# Patient Record
Sex: FEMALE | Race: WHITE | NOT HISPANIC OR LATINO | Employment: FULL TIME | URBAN - METROPOLITAN AREA
[De-identification: names, ages, dates, MRNs, and addresses within clinical notes are randomized per-mention and may not be internally consistent; named-entity substitution may affect disease eponyms.]

---

## 2019-10-08 PROCEDURE — 87070 CULTURE OTHR SPECIMN AEROBIC: CPT | Performed by: OTOLARYNGOLOGY

## 2019-10-08 PROCEDURE — 87205 SMEAR GRAM STAIN: CPT | Performed by: OTOLARYNGOLOGY

## 2022-02-17 ENCOUNTER — OFFICE VISIT (OUTPATIENT)
Dept: DERMATOLOGY | Facility: CLINIC | Age: 52
End: 2022-02-17
Payer: COMMERCIAL

## 2022-02-17 VITALS — TEMPERATURE: 99 F | HEIGHT: 61 IN | WEIGHT: 196 LBS | BODY MASS INDEX: 37 KG/M2

## 2022-02-17 DIAGNOSIS — H60.61 CHRONIC NON-INFECTIVE OTITIS EXTERNA OF RIGHT EAR, UNSPECIFIED TYPE: Primary | ICD-10-CM

## 2022-02-17 PROCEDURE — 99203 OFFICE O/P NEW LOW 30 MIN: CPT | Performed by: DERMATOLOGY

## 2022-02-17 RX ORDER — HYDROCORTISONE AND ACETIC ACID 20.75; 10.375 MG/ML; MG/ML
3 SOLUTION AURICULAR (OTIC) EVERY 6 HOURS SCHEDULED
Qty: 10 ML | Refills: 2 | Status: SHIPPED | OUTPATIENT
Start: 2022-02-17

## 2022-02-17 RX ORDER — MONTELUKAST SODIUM 10 MG/1
10 TABLET ORAL DAILY
COMMUNITY
Start: 2021-09-07

## 2022-02-17 NOTE — PROGRESS NOTES
[address] [facility], [place] DERMATOLOGY  [address] [place] [id number]  [phone]  [phone]     MRN: [id number] : 1970  Encounter: [id number]  Patient Information: Joanna Keith  Chief complaint:  Concerns regarding chronic otitis externa    History of present illness:  51-year-old female presents for concerns regarding chronic year issues irritations that has been followed by ENT and seems to have intermittent low-grade fevers only seems to be controlled with the use of the the chlorine from a pool will use of betamethasone valerate cream   Patient notes that she has been given antibiotics and other things without any improvement in the past no other concerns noted previous history of eczema noted  Past Medical History:   Diagnosis Date    Ear problems     Fatigue      Past Surgical History:   Procedure Laterality Date     SECTION      x 2     Social History   Social History     Substance and Sexual Activity   Alcohol Use Yes     Social History     Substance and Sexual Activity   Drug Use Not on file     Social History     Tobacco Use   Smoking Status Current Every Day Smoker    Types: Cigarettes   Smokeless Tobacco Never Used     Family History   Problem Relation Age of Onset    Diabetes Mother     No Known Problems Father      Meds/Allergies   No Known Allergies    Meds:  Prior to Admission medications    Medication Sig Start Date End Date Taking?  Authorizing Provider   betamethasone valerate (VALISONE) 0 1 % cream Apply topically 2 (two) times a day 20  Yes Zeynep Call MD   fluticasone [facility]) 50 mcg/act nasal spray 1 spray into each nostril daily     Yes Historical Provider, MD   montelukast (SINGULAIR) 10 mg tablet Take 10 mg by mouth daily 21  Yes Historical Provider, MD   clotrimazole 1 % external solution Apply topically 2 (two) times a day  Patient not taking: Reported on 2022     Historical Provider, MD neomycin-polymyxin-hydrocortisone (CORTISPORIN) 0 35%-10,000 units/mL-1% ophthalmic suspension 1 drop 3 (three) times a day  Patient not taking: Reported on 2/17/2022     Historical Provider, MD       Subjective:     Review of Systems:    General: negative for - chills, fatigue, fever,  weight gain or weight loss  Psychological: negative for - anxiety, behavioral disorder, concentration difficulties, decreased libido, depression, irritability, memory difficulties, mood swings, sleep disturbances or suicidal ideation  ENT: negative for - hearing difficulties , nasal congestion, nasal discharge, oral lesions, sinus pain, sneezing, sore throat  Allergy and Immunology: negative for - hives, insect bite sensitivity,  Hematological and Lymphatic: negative for - bleeding problems, blood clots,bruising, swollen lymph nodes  Endocrine: negative for - hair pattern changes, hot flashes, malaise/lethargy, mood swings, palpitations, polydipsia/polyuria, skin changes, temperature intolerance or unexpected weight change  Respiratory: negative for - cough, hemoptysis, orthopnea, shortness of breath, or wheezing  Cardiovascular: negative for - chest pain, dyspnea on exertion, edema,  Gastrointestinal: negative for - abdominal pain, nausea/vomiting  Genito-Urinary: negative for - dysuria, incontinence, irregular/heavy menses or urinary frequency/urgency  Musculoskeletal: negative for - gait disturbance, joint pain, joint stiffness, joint swelling, muscle pain, muscular weakness  Dermatological:  As in HPI  Neurological: negative for confusion, dizziness, headaches, impaired coordination/balance, memory loss, numbness/tingling, seizures, speech problems, tremors or weakness       Objective:   Temp 99 °F (37 2 °C) (Temporal)   Ht 5' 1" (1 549 m)   Wt 88 9 kg (196 lb)   BMI 37 03 kg/m²     Physical Exam:    General Appearance:    Alert, cooperative, no distress   Head:    Normocephalic, without obvious abnormality, atraumatic Skin:   A full skin exam was performed including scalp, head scalp, eyes, ears, nose, lips, neck, chest, axilla, abdomen, back, buttocks, bilateral upper extremities, bilateral lower extremities, hands, feet, fingers, toes, fingernails, and toenails ear canals with  wax noted     Assessment:     1  Chronic non-infective otitis externa of right ear, unspecified type           Plan:   Present since the amount of wax is noted I difficult for me to assess suggested patient go back to the ENT to get the ear cleared also she could consider use of Debrox to see if this will help to clear the wax    Magdaleno Belle MD  7/99/5510,0:33 PM    Portions of the record may have been created with voice recognition software   Occasional wrong word or "sound a like" substitutions may have occurred due to the inherent limitations of voice recognition software   Read the chart carefully and recognize, using context, where substitutions have occurred

## 2022-02-17 NOTE — PATIENT INSTRUCTIONS
Present since the amount of wax is noted I difficult for me to assess suggested patient go back to the ENT to get the ear cleared also she could consider use of Debrox to see if this will help to clear the wax

## 2025-01-31 ENCOUNTER — HOSPITAL ENCOUNTER (EMERGENCY)
Facility: HOSPITAL | Age: 55
Discharge: HOME | End: 2025-01-31
Attending: FAMILY MEDICINE
Payer: COMMERCIAL

## 2025-01-31 ENCOUNTER — APPOINTMENT (OUTPATIENT)
Dept: CT IMAGING | Facility: HOSPITAL | Age: 55
End: 2025-01-31
Payer: COMMERCIAL

## 2025-01-31 VITALS
BODY MASS INDEX: 37.76 KG/M2 | TEMPERATURE: 99.2 F | HEART RATE: 98 BPM | HEIGHT: 61 IN | OXYGEN SATURATION: 99 % | WEIGHT: 200 LBS | SYSTOLIC BLOOD PRESSURE: 172 MMHG | RESPIRATION RATE: 18 BRPM | DIASTOLIC BLOOD PRESSURE: 80 MMHG

## 2025-01-31 DIAGNOSIS — S09.90XA INJURY OF HEAD, INITIAL ENCOUNTER: ICD-10-CM
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
## 2025-01-31 DIAGNOSIS — M54.9 BACK PAIN: ICD-10-CM

## 2025-01-31 PROCEDURE — 99284 EMERGENCY DEPT VISIT MOD MDM: CPT | Performed by: FAMILY MEDICINE

## 2025-01-31 PROCEDURE — 99284 EMERGENCY DEPT VISIT MOD MDM: CPT

## 2025-01-31 PROCEDURE — 70450 CT HEAD/BRAIN W/O DYE: CPT

## 2025-01-31 RX ORDER — METHYLPREDNISOLONE 4 MG/1
TABLET ORAL
Qty: 21 TABLET | Refills: 0 | Status: SHIPPED | OUTPATIENT
Start: 2025-01-31

## 2025-01-31 RX ORDER — LISINOPRIL 5 MG/1
5 TABLET ORAL DAILY
COMMUNITY

## 2025-01-31 NOTE — ED PROVIDER NOTES
Time reflects when diagnosis was documented in both MDM as applicable and the Disposition within this note       Time User Action Codes Description Comment    1/31/2025  1:47 PM Danny Eduardo Add [W19.XXXA] Fall, initial encounter     1/31/2025  1:47 PM HattiemontseJoseDanny Add [S09.90XA] Injury of head, initial encounter     1/31/2025  2:05 PM Danny Eduardo Add [M54.9] Back pain           ED Disposition       ED Disposition   Discharge    Condition   Stable    Date/Time   Fri Jan 31, 2025  1:47 PM    Comment   Lizette Barlow discharge to home/self care.                   Assessment & Plan       Medical Decision Making  Amount and/or Complexity of Data Reviewed  Radiology: ordered.    Risk  Prescription drug management.    54-year-old female status post fall this morning fell backward hit her head.  Presented with complaint headache.  Patient denied loss of consciousness.  Also complained of right buttocks pain ambulatory in the ED without any difficulty.  Denies any numbness or tingling.  Depression concerns include intracranial bleed/skull fracture  Plan will obtain CT head patient was seen and examined at bedside denies any chest pain shortness of breath does complain of right buttocks pain however ambulatory in the ED without difficulty CT head reviewed within normal limits discussed with patient recommending to continue taking Tylenol or Profen as needed for pain strict precaution regarding return if notice any worsening symptoms patient verbalized understand the plan discharge home.         Medications - No data to display    ED Risk Strat Scores                          SBIRT 20yo+      Flowsheet Row Most Recent Value   Initial Alcohol Screen: US AUDIT-C     1. How often do you have a drink containing alcohol? 0 Filed at: 01/31/2025 1220   2. How many drinks containing alcohol do you have on a typical day you are drinking?  0 Filed at: 01/31/2025 1220   3a. Male UNDER 65: How often do you have five or more drinks on  one occasion? 0 Filed at: 2025 1220   3b. FEMALE Any Age, or MALE 65+: How often do you have 4 or more drinks on one occassion? 0 Filed at: 2025 1220   Audit-C Score 0 Filed at: 2025 1220   BOLIVAR: How many times in the past year have you...    Used an illegal drug or used a prescription medication for non-medical reasons? Never Filed at: 2025 1220                            History of Present Illness       Chief Complaint   Patient presents with    Head Injury     Fall on ice today, had some dizziness after fall but denies dizziness in triage.  Denies N/V/D.  Complains of head pain.  Does not take blood thinners, negative LOC.        Past Medical History:   Diagnosis Date    Ear problems     Fatigue     Hypertension     IBS (irritable bowel syndrome)       Past Surgical History:   Procedure Laterality Date     SECTION      x 2      Family History   Problem Relation Age of Onset    Diabetes Mother     No Known Problems Father       Social History     Tobacco Use    Smoking status: Former     Types: Cigarettes    Smokeless tobacco: Never   Vaping Use    Vaping status: Some Days   Substance Use Topics    Alcohol use: Yes     Comment: SOCIALLY      E-Cigarette/Vaping    E-Cigarette Use Current Some Day User       E-Cigarette/Vaping Substances      I have reviewed and agree with the history as documented.     HPI    Review of Systems   Constitutional:  Negative for chills and fever.   HENT:  Negative for rhinorrhea and sore throat.    Eyes:  Negative for visual disturbance.   Respiratory:  Negative for cough and shortness of breath.    Cardiovascular:  Negative for chest pain and leg swelling.   Gastrointestinal:  Negative for abdominal pain, diarrhea, nausea and vomiting.   Genitourinary:  Negative for dysuria.   Musculoskeletal:  Negative for back pain and myalgias.   Skin:  Negative for rash.   Neurological:  Positive for headaches. Negative for dizziness.   Psychiatric/Behavioral:   Negative for confusion.    All other systems reviewed and are negative.          Objective       ED Triage Vitals [01/31/25 1217]   Temperature Pulse Blood Pressure Respirations SpO2 Patient Position - Orthostatic VS   99.2 °F (37.3 °C) 98 (!) 172/80 18 99 % --      Temp Source Heart Rate Source BP Location FiO2 (%) Pain Score    Temporal -- -- -- 4      Vitals      Date and Time Temp Pulse SpO2 Resp BP Pain Score FACES Pain Rating User   01/31/25 1217 99.2 °F (37.3 °C) 98 99 % 18 172/80 4 -- SHRUTHI            Physical Exam  Vitals and nursing note reviewed.   Constitutional:       Appearance: She is well-developed.   HENT:      Head: Normocephalic and atraumatic.      Right Ear: External ear normal.      Left Ear: External ear normal.      Nose: Nose normal.      Mouth/Throat:      Mouth: Mucous membranes are moist.      Pharynx: No oropharyngeal exudate.   Eyes:      General: No scleral icterus.        Right eye: No discharge.         Left eye: No discharge.      Conjunctiva/sclera: Conjunctivae normal.      Pupils: Pupils are equal, round, and reactive to light.   Cardiovascular:      Rate and Rhythm: Normal rate and regular rhythm.      Pulses: Normal pulses.      Heart sounds: Normal heart sounds.   Pulmonary:      Effort: Pulmonary effort is normal. No respiratory distress.      Breath sounds: Normal breath sounds. No wheezing.   Abdominal:      General: Bowel sounds are normal.      Palpations: Abdomen is soft.   Musculoskeletal:         General: Tenderness (right buttocks: tenderness to palaption on the right buttocks, otherwise normal exam.) present. Normal range of motion.      Cervical back: Normal range of motion and neck supple.   Lymphadenopathy:      Cervical: No cervical adenopathy.   Skin:     General: Skin is warm and dry.      Capillary Refill: Capillary refill takes less than 2 seconds.   Neurological:      General: No focal deficit present.      Mental Status: She is alert and oriented to person,  place, and time.   Psychiatric:         Mood and Affect: Mood normal.         Behavior: Behavior normal.         Results Reviewed       None            CT head without contrast   Final Interpretation by Tay Garcia MD ([id number])      No acute intracranial abnormality.                  Workstation performed: [id number]             Procedures    ED Medication and Procedure Management   Prior to Admission Medications   Prescriptions Last Dose Informant Patient Reported? Taking?   betamethasone valerate (VALISONE) 0.1 % cream  Self No No   Sig: Apply topically 2 (two) times a day   clotrimazole 1 % external solution  Self Yes No   Sig: Apply topically 2 (two) times a day   Patient not taking: Reported on 2022    fluticasone (FLONASE) 50 mcg/act nasal spray  Self Yes No   Si spray into each nostril daily     hydrocortisone-acetic acid (VOSOL-HC) otic solution   No No   Sig: Administer 3 drops to the right ear every 6 (six) hours   lisinopril (ZESTRIL) 5 mg tablet   Yes Yes   Sig: Take 5 mg by mouth daily   montelukast (SINGULAIR) 10 mg tablet  Self Yes No   Sig: Take 10 mg by mouth daily   neomycin-polymyxin-hydrocortisone (CORTISPORIN) 0.35%-10,000 units/mL-1% ophthalmic suspension  Self Yes No   Si drop 3 (three) times a day   Patient not taking: Reported on 2022       Facility-Administered Medications: None     Discharge Medication List as of 2025  1:48 PM        CONTINUE these medications which have NOT CHANGED    Details   lisinopril (ZESTRIL) 5 mg tablet Take 5 mg by mouth daily, Historical Med      betamethasone valerate (VALISONE) 0.1 % cream Apply topically 2 (two) times a day, Starting Mon 2020, Normal      clotrimazole 1 % external solution Apply topically 2 (two) times a day, Historical Med      fluticasone (FLONASE) 50 mcg/act nasal spray 1 spray into each nostril daily  , Historical Med      hydrocortisone-acetic acid (VOSOL-HC) otic solution Administer 3 drops to  the right ear every 6 (six) hours, Starting Thu 2/17/2022, Normal      montelukast (SINGULAIR) 10 mg tablet Take 10 mg by mouth daily, Starting Tue 9/7/2021, Historical Med      neomycin-polymyxin-hydrocortisone (CORTISPORIN) 0.35%-10,000 units/mL-1% ophthalmic suspension 1 drop 3 (three) times a day, Historical Med           No discharge procedures on file.  ED SEPSIS DOCUMENTATION   Time reflects when diagnosis was documented in both MDM as applicable and the Disposition within this note       Time User Action Codes Description Comment    1/31/2025  1:47 PM Danny Eduardo Add [W19.XXXA] Fall, initial encounter     1/31/2025  1:47 PM Danny Eduardo Add [S09.90XA] Injury of head, initial encounter     1/31/2025  2:05 PM Danny Eduardo Add [M54.9] Back pain                  Danny Eduardo MD  01/31/25 1615